# Patient Record
Sex: FEMALE | Race: BLACK OR AFRICAN AMERICAN | Employment: UNEMPLOYED | URBAN - METROPOLITAN AREA
[De-identification: names, ages, dates, MRNs, and addresses within clinical notes are randomized per-mention and may not be internally consistent; named-entity substitution may affect disease eponyms.]

---

## 2019-09-13 ENCOUNTER — ED HISTORICAL/CONVERTED ENCOUNTER (OUTPATIENT)
Dept: OTHER | Age: 24
End: 2019-09-13

## 2024-10-29 ENCOUNTER — APPOINTMENT (OUTPATIENT)
Facility: HOSPITAL | Age: 29
End: 2024-10-29
Payer: MEDICARE

## 2024-10-29 ENCOUNTER — HOSPITAL ENCOUNTER (EMERGENCY)
Facility: HOSPITAL | Age: 29
Discharge: HOME | End: 2024-10-29
Attending: STUDENT IN AN ORGANIZED HEALTH CARE EDUCATION/TRAINING PROGRAM
Payer: MEDICARE

## 2024-10-29 VITALS
DIASTOLIC BLOOD PRESSURE: 52 MMHG | HEIGHT: 64 IN | SYSTOLIC BLOOD PRESSURE: 115 MMHG | TEMPERATURE: 98.8 F | OXYGEN SATURATION: 97 % | WEIGHT: 190 LBS | BODY MASS INDEX: 32.44 KG/M2 | HEART RATE: 68 BPM | RESPIRATION RATE: 19 BRPM

## 2024-10-29 DIAGNOSIS — J06.9 VIRAL URI WITH COUGH: Primary | ICD-10-CM

## 2024-10-29 DIAGNOSIS — D64.9 ANEMIA, UNSPECIFIED TYPE: ICD-10-CM

## 2024-10-29 DIAGNOSIS — D57.00 SICKLE CELL CRISIS (HCC): ICD-10-CM

## 2024-10-29 LAB
ABO + RH BLD: NORMAL
ALBUMIN SERPL-MCNC: 3.5 G/DL (ref 3.5–5)
ALBUMIN/GLOB SERPL: 1.3 (ref 1.1–2.2)
ALP SERPL-CCNC: 123 U/L (ref 45–117)
ALT SERPL-CCNC: 27 U/L (ref 12–78)
ANION GAP SERPL CALC-SCNC: 5 MMOL/L (ref 2–12)
APPEARANCE UR: ABNORMAL
AST SERPL-CCNC: 28 U/L (ref 15–37)
BACTERIA URNS QL MICRO: ABNORMAL /HPF
BASOPHILS # BLD: 0.1 K/UL (ref 0–0.1)
BASOPHILS NFR BLD: 1 % (ref 0–1)
BILIRUB SERPL-MCNC: 3.6 MG/DL (ref 0.2–1)
BILIRUB UR QL: NEGATIVE
BLOOD BANK CMNT PATIENT-IMP: NORMAL
BLOOD GROUP ANTIBODIES SERPL: NORMAL
BUN SERPL-MCNC: 7 MG/DL (ref 6–20)
BUN/CREAT SERPL: 13 (ref 12–20)
CALCIUM SERPL-MCNC: 8.7 MG/DL (ref 8.5–10.1)
CHLORIDE SERPL-SCNC: 109 MMOL/L (ref 97–108)
CO2 SERPL-SCNC: 29 MMOL/L (ref 21–32)
COLOR UR: ABNORMAL
CREAT SERPL-MCNC: 0.52 MG/DL (ref 0.55–1.02)
DIFFERENTIAL METHOD BLD: ABNORMAL
EOSINOPHIL # BLD: 1 K/UL (ref 0–0.4)
EOSINOPHIL NFR BLD: 8 % (ref 0–7)
EPITH CASTS URNS QL MICRO: ABNORMAL /LPF
ERYTHROCYTE [DISTWIDTH] IN BLOOD BY AUTOMATED COUNT: 20.7 % (ref 11.5–14.5)
GLOBULIN SER CALC-MCNC: 2.8 G/DL (ref 2–4)
GLUCOSE SERPL-MCNC: 91 MG/DL (ref 65–100)
GLUCOSE UR STRIP.AUTO-MCNC: NEGATIVE MG/DL
HCG UR QL: NEGATIVE
HCT VFR BLD AUTO: 18.5 % (ref 35–47)
HGB BLD-MCNC: 6.6 G/DL (ref 11.5–16)
HGB UR QL STRIP: NEGATIVE
IMM GRANULOCYTES # BLD AUTO: 0.1 K/UL (ref 0–0.04)
IMM GRANULOCYTES NFR BLD AUTO: 1 % (ref 0–0.5)
KETONES UR QL STRIP.AUTO: NEGATIVE MG/DL
LEUKOCYTE ESTERASE UR QL STRIP.AUTO: NEGATIVE
LYMPHOCYTES # BLD: 2.4 K/UL (ref 0.8–3.5)
LYMPHOCYTES NFR BLD: 19 % (ref 12–49)
MCH RBC QN AUTO: 36.1 PG (ref 26–34)
MCHC RBC AUTO-ENTMCNC: 35.7 G/DL (ref 30–36.5)
MCV RBC AUTO: 101.1 FL (ref 80–99)
MONOCYTES # BLD: 1.4 K/UL (ref 0–1)
MONOCYTES NFR BLD: 11 % (ref 5–13)
MUCOUS THREADS URNS QL MICRO: ABNORMAL /LPF
NEUTS SEG # BLD: 7.7 K/UL (ref 1.8–8)
NEUTS SEG NFR BLD: 60 % (ref 32–75)
NITRITE UR QL STRIP.AUTO: NEGATIVE
NRBC # BLD: 0.32 K/UL (ref 0–0.01)
NRBC BLD-RTO: 2.5 PER 100 WBC
PH UR STRIP: 5.5 (ref 5–8)
PLATELET # BLD AUTO: 484 K/UL (ref 150–400)
PMV BLD AUTO: 10.1 FL (ref 8.9–12.9)
POTASSIUM SERPL-SCNC: 4.1 MMOL/L (ref 3.5–5.1)
PROT SERPL-MCNC: 6.3 G/DL (ref 6.4–8.2)
PROT UR STRIP-MCNC: NEGATIVE MG/DL
RBC # BLD AUTO: 1.83 M/UL (ref 3.8–5.2)
RBC #/AREA URNS HPF: ABNORMAL /HPF (ref 0–5)
RBC MORPH BLD: ABNORMAL
RBC MORPH BLD: ABNORMAL
RETICS # AUTO: 0.39 M/UL (ref 0.02–0.08)
RETICS/RBC NFR AUTO: 20.9 % (ref 0.7–2.1)
SODIUM SERPL-SCNC: 143 MMOL/L (ref 136–145)
SP GR UR REFRACTOMETRY: 1.01
SPECIMEN EXP DATE BLD: NORMAL
TROPONIN I SERPL HS-MCNC: 6 NG/L (ref 0–51)
URINE CULTURE IF INDICATED: ABNORMAL
UROBILINOGEN UR QL STRIP.AUTO: 1 EU/DL (ref 0.2–1)
WBC # BLD AUTO: 12.7 K/UL (ref 3.6–11)
WBC URNS QL MICRO: ABNORMAL /HPF (ref 0–4)

## 2024-10-29 PROCEDURE — 6360000002 HC RX W HCPCS: Performed by: STUDENT IN AN ORGANIZED HEALTH CARE EDUCATION/TRAINING PROGRAM

## 2024-10-29 PROCEDURE — 85025 COMPLETE CBC W/AUTO DIFF WBC: CPT

## 2024-10-29 PROCEDURE — 36415 COLL VENOUS BLD VENIPUNCTURE: CPT

## 2024-10-29 PROCEDURE — 71046 X-RAY EXAM CHEST 2 VIEWS: CPT

## 2024-10-29 PROCEDURE — 93005 ELECTROCARDIOGRAM TRACING: CPT | Performed by: STUDENT IN AN ORGANIZED HEALTH CARE EDUCATION/TRAINING PROGRAM

## 2024-10-29 PROCEDURE — 99285 EMERGENCY DEPT VISIT HI MDM: CPT

## 2024-10-29 PROCEDURE — 96376 TX/PRO/DX INJ SAME DRUG ADON: CPT

## 2024-10-29 PROCEDURE — 80053 COMPREHEN METABOLIC PANEL: CPT

## 2024-10-29 PROCEDURE — 81025 URINE PREGNANCY TEST: CPT

## 2024-10-29 PROCEDURE — 84484 ASSAY OF TROPONIN QUANT: CPT

## 2024-10-29 PROCEDURE — 86901 BLOOD TYPING SEROLOGIC RH(D): CPT

## 2024-10-29 PROCEDURE — 96374 THER/PROPH/DIAG INJ IV PUSH: CPT

## 2024-10-29 PROCEDURE — 81001 URINALYSIS AUTO W/SCOPE: CPT

## 2024-10-29 PROCEDURE — 86850 RBC ANTIBODY SCREEN: CPT

## 2024-10-29 PROCEDURE — 85045 AUTOMATED RETICULOCYTE COUNT: CPT

## 2024-10-29 PROCEDURE — 86900 BLOOD TYPING SEROLOGIC ABO: CPT

## 2024-10-29 PROCEDURE — 6370000000 HC RX 637 (ALT 250 FOR IP): Performed by: STUDENT IN AN ORGANIZED HEALTH CARE EDUCATION/TRAINING PROGRAM

## 2024-10-29 RX ORDER — ONDANSETRON 2 MG/ML
4 INJECTION INTRAMUSCULAR; INTRAVENOUS EVERY 6 HOURS PRN
Status: DISCONTINUED | OUTPATIENT
Start: 2024-10-29 | End: 2024-10-30 | Stop reason: HOSPADM

## 2024-10-29 RX ORDER — HEPARIN 100 UNIT/ML
300 SYRINGE INTRAVENOUS PRN
Status: DISCONTINUED | OUTPATIENT
Start: 2024-10-29 | End: 2024-10-30 | Stop reason: HOSPADM

## 2024-10-29 RX ORDER — HYDROMORPHONE HYDROCHLORIDE 1 MG/ML
4 INJECTION, SOLUTION INTRAMUSCULAR; INTRAVENOUS; SUBCUTANEOUS
Status: DISCONTINUED | OUTPATIENT
Start: 2024-10-29 | End: 2024-10-29

## 2024-10-29 RX ORDER — OXYCODONE HYDROCHLORIDE 5 MG/1
40 TABLET ORAL
Status: COMPLETED | OUTPATIENT
Start: 2024-10-29 | End: 2024-10-29

## 2024-10-29 RX ORDER — DIPHENHYDRAMINE HCL 25 MG
25 CAPSULE ORAL
Status: COMPLETED | OUTPATIENT
Start: 2024-10-29 | End: 2024-10-29

## 2024-10-29 RX ORDER — HYDROMORPHONE HYDROCHLORIDE 1 MG/ML
2 INJECTION, SOLUTION INTRAMUSCULAR; INTRAVENOUS; SUBCUTANEOUS
Status: COMPLETED | OUTPATIENT
Start: 2024-10-29 | End: 2024-10-29

## 2024-10-29 RX ORDER — HYDROMORPHONE HYDROCHLORIDE 1 MG/ML
4 INJECTION, SOLUTION INTRAMUSCULAR; INTRAVENOUS; SUBCUTANEOUS
Status: COMPLETED | OUTPATIENT
Start: 2024-10-29 | End: 2024-10-29

## 2024-10-29 RX ADMIN — HYDROMORPHONE HYDROCHLORIDE 4 MG: 1 INJECTION, SOLUTION INTRAMUSCULAR; INTRAVENOUS; SUBCUTANEOUS at 18:08

## 2024-10-29 RX ADMIN — HEPARIN 300 UNITS: 100 SYRINGE at 21:18

## 2024-10-29 RX ADMIN — HYDROMORPHONE HYDROCHLORIDE 2 MG: 1 INJECTION, SOLUTION INTRAMUSCULAR; INTRAVENOUS; SUBCUTANEOUS at 20:19

## 2024-10-29 RX ADMIN — OXYCODONE 40 MG: 5 TABLET ORAL at 17:59

## 2024-10-29 RX ADMIN — DIPHENHYDRAMINE HYDROCHLORIDE 25 MG: 25 CAPSULE ORAL at 17:59

## 2024-10-29 ASSESSMENT — PAIN SCALES - GENERAL
PAINLEVEL_OUTOF10: 8
PAINLEVEL_OUTOF10: 5

## 2024-10-29 ASSESSMENT — PAIN DESCRIPTION - ORIENTATION
ORIENTATION: MID

## 2024-10-29 ASSESSMENT — PAIN DESCRIPTION - DESCRIPTORS
DESCRIPTORS: DISCOMFORT
DESCRIPTORS: ACHING
DESCRIPTORS: ACHING;SHARP

## 2024-10-29 ASSESSMENT — PAIN DESCRIPTION - LOCATION
LOCATION: CHEST

## 2024-10-29 ASSESSMENT — PAIN - FUNCTIONAL ASSESSMENT
PAIN_FUNCTIONAL_ASSESSMENT: 0-10
PAIN_FUNCTIONAL_ASSESSMENT: 0-10

## 2024-10-29 NOTE — ED TRIAGE NOTES
Pt c/o sickle cell pain x 2 weeks.  Pt reports chest pain, chills, and generalized fatigue. Pt states her daughter was exposed to Toxoplasmosis and rhino virus. Pt states her daughter had a fever and duo nebs. Pt denies n/v/d. Pt reports inability to eat or drink. Pt denies any OTC medication.

## 2024-10-29 NOTE — ED PROVIDER NOTES
[facility] EMERGENCY DEPT  EMERGENCY DEPARTMENT ENCOUNTER       Pt Name: Tika Landers  MRN: [id number]  Birthdate 1995  Date of evaluation: 10/29/2024  Provider: Ronaldo Ahuja MD   PCP: Tu Little III, MD  Note Started: 4:57 PM EDT 10/29/24     CHIEF COMPLAINT       Chief Complaint   Patient presents with    Sickle Cell Pain Crisis        HISTORY OF PRESENT ILLNESS: 1 or more elements      History From: patient, History limited by: none     Tika Landers is a 29 y.o. female presenting with cough, chest pain, joint pain.  She notes this has been going on for about 2 weeks.  Denies fever.  Has a decreased appetite but no vomiting.  Notes her daughter was recently admitted then discharged with toxo pneumonia as there was an outbreak at her school.  She has been taking her methadone and oxycodone until last night when she stopped because it wasn't working.         Please See MDM for Additional Details of the HPI/PMH  Nursing Notes were all reviewed and agreed with or any disagreements were addressed in the HPI.     REVIEW OF SYSTEMS        Positives and Pertinent negatives as per HPI.    PAST HISTORY     Past Medical History:  Past Medical History:   Diagnosis Date    Asthma     Infusion reaction     Sickle cell anemia (HCC)        Past Surgical History:  History reviewed. No pertinent surgical history.    Family History:  History reviewed. No pertinent family history.    Social History:  Social History     Tobacco Use    Smoking status: Never    Smokeless tobacco: Never   Substance Use Topics    Alcohol use: Yes       Allergies:  Allergies   Allergen Reactions    Nsaids Anaphylaxis    Bactrim [Sulfamethoxazole-Trimethoprim]     Ciprofloxacin     Promethazine     Xarelto [Rivaroxaban]        CURRENT MEDICATIONS      Previous Medications    No medications on file       SCREENINGS               No data recorded         PHYSICAL EXAM      ED Triage Vitals   Encounter Vitals Group      BP 10/29/24 1644

## 2024-10-30 LAB
EKG ATRIAL RATE: 67 BPM
EKG DIAGNOSIS: NORMAL
EKG P AXIS: 15 DEGREES
EKG P-R INTERVAL: 124 MS
EKG Q-T INTERVAL: 402 MS
EKG QRS DURATION: 90 MS
EKG QTC CALCULATION (BAZETT): 424 MS
EKG R AXIS: 38 DEGREES
EKG T AXIS: 24 DEGREES
EKG VENTRICULAR RATE: 67 BPM

## 2024-10-30 PROCEDURE — 93010 ELECTROCARDIOGRAM REPORT: CPT | Performed by: SPECIALIST

## 2024-12-12 ENCOUNTER — APPOINTMENT (OUTPATIENT)
Facility: HOSPITAL | Age: 29
DRG: 812 | End: 2024-12-12
Payer: MEDICARE

## 2024-12-12 ENCOUNTER — HOSPITAL ENCOUNTER (INPATIENT)
Facility: HOSPITAL | Age: 29
LOS: 1 days | Discharge: LEFT BEFORE DISCHARGE | DRG: 812 | End: 2024-12-14
Attending: EMERGENCY MEDICINE | Admitting: INTERNAL MEDICINE
Payer: MEDICARE

## 2024-12-12 DIAGNOSIS — D57.00 SICKLE CELL PAIN CRISIS (HCC): Primary | ICD-10-CM

## 2024-12-12 DIAGNOSIS — D64.9 CHRONIC ANEMIA: ICD-10-CM

## 2024-12-12 DIAGNOSIS — R52 INTRACTABLE PAIN: ICD-10-CM

## 2024-12-12 LAB
ALBUMIN SERPL-MCNC: 4.1 G/DL (ref 3.5–5)
ALBUMIN/GLOB SERPL: 1.3 (ref 1.1–2.2)
ALP SERPL-CCNC: 133 U/L (ref 45–117)
ALT SERPL-CCNC: 20 U/L (ref 12–78)
ANION GAP SERPL CALC-SCNC: 13 MMOL/L (ref 2–12)
AST SERPL-CCNC: 17 U/L (ref 15–37)
BASOPHILS # BLD: 0.1 K/UL (ref 0–0.1)
BASOPHILS NFR BLD: 1 % (ref 0–1)
BILIRUB SERPL-MCNC: 2.6 MG/DL (ref 0.2–1)
BUN SERPL-MCNC: 4 MG/DL (ref 6–20)
BUN/CREAT SERPL: 6 (ref 12–20)
CALCIUM SERPL-MCNC: 9.3 MG/DL (ref 8.5–10.1)
CHLORIDE SERPL-SCNC: 106 MMOL/L (ref 97–108)
CO2 SERPL-SCNC: 23 MMOL/L (ref 21–32)
CREAT SERPL-MCNC: 0.69 MG/DL (ref 0.55–1.02)
DIFFERENTIAL METHOD BLD: ABNORMAL
EOSINOPHIL # BLD: 0.1 K/UL (ref 0–0.4)
EOSINOPHIL NFR BLD: 1 % (ref 0–7)
ERYTHROCYTE [DISTWIDTH] IN BLOOD BY AUTOMATED COUNT: 16.9 % (ref 11.5–14.5)
GLOBULIN SER CALC-MCNC: 3.1 G/DL (ref 2–4)
GLUCOSE SERPL-MCNC: 95 MG/DL (ref 65–100)
HCT VFR BLD AUTO: 21.6 % (ref 35–47)
HGB BLD-MCNC: 7.6 G/DL (ref 11.5–16)
IMM GRANULOCYTES # BLD AUTO: 0 K/UL (ref 0–0.04)
IMM GRANULOCYTES NFR BLD AUTO: 0 % (ref 0–0.5)
LYMPHOCYTES # BLD: 1.3 K/UL (ref 0.8–3.5)
LYMPHOCYTES NFR BLD: 13 % (ref 12–49)
MAGNESIUM SERPL-MCNC: 2.2 MG/DL (ref 1.6–2.4)
MCH RBC QN AUTO: 36.4 PG (ref 26–34)
MCHC RBC AUTO-ENTMCNC: 35.2 G/DL (ref 30–36.5)
MCV RBC AUTO: 103.3 FL (ref 80–99)
MONOCYTES # BLD: 0.6 K/UL (ref 0–1)
MONOCYTES NFR BLD: 6 % (ref 5–13)
NEUTS SEG # BLD: 7.9 K/UL (ref 1.8–8)
NEUTS SEG NFR BLD: 79 % (ref 32–75)
NRBC # BLD: 0.25 K/UL (ref 0–0.01)
NRBC BLD-RTO: 2.5 PER 100 WBC
PLATELET # BLD AUTO: 530 K/UL (ref 150–400)
PMV BLD AUTO: 9.5 FL (ref 8.9–12.9)
POTASSIUM SERPL-SCNC: 3.5 MMOL/L (ref 3.5–5.1)
PROT SERPL-MCNC: 7.2 G/DL (ref 6.4–8.2)
RBC # BLD AUTO: 2.09 M/UL (ref 3.8–5.2)
RETICS # AUTO: 0.41 M/UL (ref 0.02–0.08)
RETICS/RBC NFR AUTO: 18.4 % (ref 0.7–2.1)
SODIUM SERPL-SCNC: 142 MMOL/L (ref 136–145)
WBC # BLD AUTO: 10 K/UL (ref 3.6–11)

## 2024-12-12 PROCEDURE — 80053 COMPREHEN METABOLIC PANEL: CPT

## 2024-12-12 PROCEDURE — 99285 EMERGENCY DEPT VISIT HI MDM: CPT

## 2024-12-12 PROCEDURE — 71045 X-RAY EXAM CHEST 1 VIEW: CPT

## 2024-12-12 PROCEDURE — 6370000000 HC RX 637 (ALT 250 FOR IP): Performed by: EMERGENCY MEDICINE

## 2024-12-12 PROCEDURE — 6360000002 HC RX W HCPCS: Performed by: EMERGENCY MEDICINE

## 2024-12-12 PROCEDURE — 96374 THER/PROPH/DIAG INJ IV PUSH: CPT

## 2024-12-12 PROCEDURE — 85025 COMPLETE CBC W/AUTO DIFF WBC: CPT

## 2024-12-12 PROCEDURE — 85045 AUTOMATED RETICULOCYTE COUNT: CPT

## 2024-12-12 PROCEDURE — 93005 ELECTROCARDIOGRAM TRACING: CPT | Performed by: EMERGENCY MEDICINE

## 2024-12-12 PROCEDURE — 83735 ASSAY OF MAGNESIUM: CPT

## 2024-12-12 RX ORDER — DIPHENHYDRAMINE HCL 25 MG
50 CAPSULE ORAL
Status: COMPLETED | OUTPATIENT
Start: 2024-12-12 | End: 2024-12-12

## 2024-12-12 RX ORDER — HYDROMORPHONE HYDROCHLORIDE 1 MG/ML
4 INJECTION, SOLUTION INTRAMUSCULAR; INTRAVENOUS; SUBCUTANEOUS
Status: COMPLETED | OUTPATIENT
Start: 2024-12-12 | End: 2024-12-12

## 2024-12-12 RX ORDER — OXYCODONE HYDROCHLORIDE 5 MG/1
40 TABLET ORAL
Status: COMPLETED | OUTPATIENT
Start: 2024-12-12 | End: 2024-12-12

## 2024-12-12 RX ADMIN — HYDROMORPHONE HYDROCHLORIDE 4 MG: 1 INJECTION, SOLUTION INTRAMUSCULAR; INTRAVENOUS; SUBCUTANEOUS at 23:32

## 2024-12-12 RX ADMIN — OXYCODONE 40 MG: 5 TABLET ORAL at 23:33

## 2024-12-12 RX ADMIN — DIPHENHYDRAMINE HYDROCHLORIDE 50 MG: 25 CAPSULE ORAL at 23:33

## 2024-12-12 ASSESSMENT — PAIN DESCRIPTION - PAIN TYPE: TYPE: ACUTE PAIN

## 2024-12-12 ASSESSMENT — PAIN DESCRIPTION - ORIENTATION: ORIENTATION: RIGHT;LEFT;MID

## 2024-12-12 ASSESSMENT — PAIN SCALES - GENERAL
PAINLEVEL_OUTOF10: 9
PAINLEVEL_OUTOF10: 8

## 2024-12-12 ASSESSMENT — PAIN DESCRIPTION - DESCRIPTORS
DESCRIPTORS: DISCOMFORT
DESCRIPTORS: BURNING;STABBING

## 2024-12-12 ASSESSMENT — PAIN DESCRIPTION - LOCATION
LOCATION: CHEST;GENERALIZED
LOCATION: CHEST

## 2024-12-12 ASSESSMENT — PAIN - FUNCTIONAL ASSESSMENT: PAIN_FUNCTIONAL_ASSESSMENT: 0-10

## 2024-12-13 PROBLEM — D57.00 SICKLE CELL PAIN CRISIS (HCC): Status: ACTIVE | Noted: 2024-12-13

## 2024-12-13 LAB
ALBUMIN SERPL-MCNC: 3.9 G/DL (ref 3.5–5)
ALBUMIN/GLOB SERPL: 1.2 (ref 1.1–2.2)
ALP SERPL-CCNC: 126 U/L (ref 45–117)
ALT SERPL-CCNC: 22 U/L (ref 12–78)
ANION GAP SERPL CALC-SCNC: 12 MMOL/L (ref 2–12)
AST SERPL-CCNC: 16 U/L (ref 15–37)
BASOPHILS # BLD: 0.1 K/UL (ref 0–0.1)
BASOPHILS NFR BLD: 1 % (ref 0–1)
BILIRUB SERPL-MCNC: 2.6 MG/DL (ref 0.2–1)
BUN SERPL-MCNC: 4 MG/DL (ref 6–20)
BUN/CREAT SERPL: 5 (ref 12–20)
CALCIUM SERPL-MCNC: 9.1 MG/DL (ref 8.5–10.1)
CHLORIDE SERPL-SCNC: 107 MMOL/L (ref 97–108)
CO2 SERPL-SCNC: 24 MMOL/L (ref 21–32)
CREAT SERPL-MCNC: 0.74 MG/DL (ref 0.55–1.02)
DIFFERENTIAL METHOD BLD: ABNORMAL
EKG ATRIAL RATE: 59 BPM
EKG DIAGNOSIS: NORMAL
EKG P AXIS: 37 DEGREES
EKG P-R INTERVAL: 132 MS
EKG Q-T INTERVAL: 416 MS
EKG QRS DURATION: 92 MS
EKG QTC CALCULATION (BAZETT): 411 MS
EKG R AXIS: 69 DEGREES
EKG T AXIS: 49 DEGREES
EKG VENTRICULAR RATE: 59 BPM
EOSINOPHIL # BLD: 0.4 K/UL (ref 0–0.4)
EOSINOPHIL NFR BLD: 3 % (ref 0–7)
ERYTHROCYTE [DISTWIDTH] IN BLOOD BY AUTOMATED COUNT: 16.8 % (ref 11.5–14.5)
FERRITIN SERPL-MCNC: 2174 NG/ML (ref 26–388)
FOLATE SERPL-MCNC: 8 NG/ML (ref 5–21)
GLOBULIN SER CALC-MCNC: 3.2 G/DL (ref 2–4)
GLUCOSE SERPL-MCNC: 104 MG/DL (ref 65–100)
HCG SERPL QL: NEGATIVE
HCT VFR BLD AUTO: 20.6 % (ref 35–47)
HGB BLD-MCNC: 7.4 G/DL (ref 11.5–16)
IMM GRANULOCYTES # BLD AUTO: 0 K/UL (ref 0–0.04)
IMM GRANULOCYTES NFR BLD AUTO: 0 % (ref 0–0.5)
IRON SATN MFR SERPL: 84 % (ref 20–50)
IRON SERPL-MCNC: 142 UG/DL (ref 35–150)
LIPASE SERPL-CCNC: 35 U/L (ref 13–75)
LYMPHOCYTES # BLD: 2.6 K/UL (ref 0.8–3.5)
LYMPHOCYTES NFR BLD: 25 % (ref 12–49)
MAGNESIUM SERPL-MCNC: 2.4 MG/DL (ref 1.6–2.4)
MCH RBC QN AUTO: 37.4 PG (ref 26–34)
MCHC RBC AUTO-ENTMCNC: 35.9 G/DL (ref 30–36.5)
MCV RBC AUTO: 104 FL (ref 80–99)
MONOCYTES # BLD: 1.4 K/UL (ref 0–1)
MONOCYTES NFR BLD: 13 % (ref 5–13)
NEUTS SEG # BLD: 6.1 K/UL (ref 1.8–8)
NEUTS SEG NFR BLD: 58 % (ref 32–75)
NRBC # BLD: 0.22 K/UL (ref 0–0.01)
NRBC BLD-RTO: 2.1 PER 100 WBC
PHOSPHATE SERPL-MCNC: 4 MG/DL (ref 2.6–4.7)
PLATELET # BLD AUTO: 493 K/UL (ref 150–400)
PMV BLD AUTO: 9.8 FL (ref 8.9–12.9)
POTASSIUM SERPL-SCNC: 3.3 MMOL/L (ref 3.5–5.1)
PROT SERPL-MCNC: 7.1 G/DL (ref 6.4–8.2)
RBC # BLD AUTO: 1.98 M/UL (ref 3.8–5.2)
SODIUM SERPL-SCNC: 143 MMOL/L (ref 136–145)
TIBC SERPL-MCNC: 170 UG/DL (ref 250–450)
TROPONIN I SERPL HS-MCNC: 5 NG/L (ref 0–51)
TSH SERPL DL<=0.05 MIU/L-ACNC: 0.4 UIU/ML (ref 0.36–3.74)
VIT B12 SERPL-MCNC: 505 PG/ML (ref 193–986)
WBC # BLD AUTO: 10.6 K/UL (ref 3.6–11)

## 2024-12-13 PROCEDURE — 36415 COLL VENOUS BLD VENIPUNCTURE: CPT

## 2024-12-13 PROCEDURE — 2580000003 HC RX 258: Performed by: INTERNAL MEDICINE

## 2024-12-13 PROCEDURE — 84443 ASSAY THYROID STIM HORMONE: CPT

## 2024-12-13 PROCEDURE — 83690 ASSAY OF LIPASE: CPT

## 2024-12-13 PROCEDURE — 6370000000 HC RX 637 (ALT 250 FOR IP): Performed by: INTERNAL MEDICINE

## 2024-12-13 PROCEDURE — 84100 ASSAY OF PHOSPHORUS: CPT

## 2024-12-13 PROCEDURE — 84484 ASSAY OF TROPONIN QUANT: CPT

## 2024-12-13 PROCEDURE — 83550 IRON BINDING TEST: CPT

## 2024-12-13 PROCEDURE — 82607 VITAMIN B-12: CPT

## 2024-12-13 PROCEDURE — 85025 COMPLETE CBC W/AUTO DIFF WBC: CPT

## 2024-12-13 PROCEDURE — 82746 ASSAY OF FOLIC ACID SERUM: CPT

## 2024-12-13 PROCEDURE — 6360000002 HC RX W HCPCS: Performed by: INTERNAL MEDICINE

## 2024-12-13 PROCEDURE — 84703 CHORIONIC GONADOTROPIN ASSAY: CPT

## 2024-12-13 PROCEDURE — 83735 ASSAY OF MAGNESIUM: CPT

## 2024-12-13 PROCEDURE — 82728 ASSAY OF FERRITIN: CPT

## 2024-12-13 PROCEDURE — 80053 COMPREHEN METABOLIC PANEL: CPT

## 2024-12-13 PROCEDURE — 6360000002 HC RX W HCPCS: Performed by: EMERGENCY MEDICINE

## 2024-12-13 PROCEDURE — 93010 ELECTROCARDIOGRAM REPORT: CPT | Performed by: SPECIALIST

## 2024-12-13 PROCEDURE — 1200000000 HC SEMI PRIVATE

## 2024-12-13 PROCEDURE — 83540 ASSAY OF IRON: CPT

## 2024-12-13 PROCEDURE — 96376 TX/PRO/DX INJ SAME DRUG ADON: CPT

## 2024-12-13 RX ORDER — MECOBALAMIN 5000 MCG
5 TABLET,DISINTEGRATING ORAL NIGHTLY PRN
Status: DISCONTINUED | OUTPATIENT
Start: 2024-12-13 | End: 2024-12-14 | Stop reason: HOSPADM

## 2024-12-13 RX ORDER — ALBUTEROL SULFATE 0.83 MG/ML
2.5 SOLUTION RESPIRATORY (INHALATION) EVERY 6 HOURS PRN
Status: DISCONTINUED | OUTPATIENT
Start: 2024-12-13 | End: 2024-12-14 | Stop reason: HOSPADM

## 2024-12-13 RX ORDER — METHADONE HYDROCHLORIDE 10 MG/1
10 TABLET ORAL 2 TIMES DAILY
COMMUNITY

## 2024-12-13 RX ORDER — APIXABAN 5 MG/1
5 TABLET, FILM COATED ORAL 2 TIMES DAILY
Status: ON HOLD | COMMUNITY
Start: 2024-09-23 | End: 2024-12-14

## 2024-12-13 RX ORDER — HYDROMORPHONE HYDROCHLORIDE 1 MG/ML
1 INJECTION, SOLUTION INTRAMUSCULAR; INTRAVENOUS; SUBCUTANEOUS
Status: DISCONTINUED | OUTPATIENT
Start: 2024-12-13 | End: 2024-12-13

## 2024-12-13 RX ORDER — MAGNESIUM SULFATE IN WATER 40 MG/ML
2000 INJECTION, SOLUTION INTRAVENOUS PRN
Status: DISCONTINUED | OUTPATIENT
Start: 2024-12-13 | End: 2024-12-14 | Stop reason: HOSPADM

## 2024-12-13 RX ORDER — NORETHINDRONE ACETATE AND ETHINYL ESTRADIOL 1MG-20(21)
1 KIT ORAL DAILY
Status: ON HOLD | COMMUNITY
End: 2024-12-13 | Stop reason: ALTCHOICE

## 2024-12-13 RX ORDER — ACETAMINOPHEN 325 MG/1
650 TABLET ORAL EVERY 6 HOURS PRN
Status: DISCONTINUED | OUTPATIENT
Start: 2024-12-13 | End: 2024-12-14 | Stop reason: HOSPADM

## 2024-12-13 RX ORDER — POTASSIUM CHLORIDE 750 MG/1
40 TABLET, EXTENDED RELEASE ORAL PRN
Status: DISCONTINUED | OUTPATIENT
Start: 2024-12-13 | End: 2024-12-14 | Stop reason: HOSPADM

## 2024-12-13 RX ORDER — BISACODYL 10 MG
10 SUPPOSITORY, RECTAL RECTAL DAILY PRN
Status: DISCONTINUED | OUTPATIENT
Start: 2024-12-13 | End: 2024-12-14 | Stop reason: HOSPADM

## 2024-12-13 RX ORDER — HYDROMORPHONE HYDROCHLORIDE 1 MG/ML
2 INJECTION, SOLUTION INTRAMUSCULAR; INTRAVENOUS; SUBCUTANEOUS
Status: COMPLETED | OUTPATIENT
Start: 2024-12-13 | End: 2024-12-13

## 2024-12-13 RX ORDER — POLYETHYLENE GLYCOL 3350 17 G/17G
17 POWDER, FOR SOLUTION ORAL DAILY PRN
Status: DISCONTINUED | OUTPATIENT
Start: 2024-12-13 | End: 2024-12-14 | Stop reason: HOSPADM

## 2024-12-13 RX ORDER — HEPARIN 100 UNIT/ML
300 SYRINGE INTRAVENOUS PRN
Status: DISCONTINUED | OUTPATIENT
Start: 2024-12-13 | End: 2024-12-14 | Stop reason: HOSPADM

## 2024-12-13 RX ORDER — OXYCODONE HCL 10 MG/1
10 TABLET, FILM COATED, EXTENDED RELEASE ORAL EVERY 12 HOURS
Status: ON HOLD | COMMUNITY
End: 2024-12-13 | Stop reason: ALTCHOICE

## 2024-12-13 RX ORDER — SODIUM CHLORIDE 450 MG/100ML
INJECTION, SOLUTION INTRAVENOUS CONTINUOUS
Status: DISCONTINUED | OUTPATIENT
Start: 2024-12-13 | End: 2024-12-14 | Stop reason: HOSPADM

## 2024-12-13 RX ORDER — NALOXONE HYDROCHLORIDE 0.4 MG/ML
INJECTION, SOLUTION INTRAMUSCULAR; INTRAVENOUS; SUBCUTANEOUS PRN
Status: DISCONTINUED | OUTPATIENT
Start: 2024-12-13 | End: 2024-12-14 | Stop reason: HOSPADM

## 2024-12-13 RX ORDER — OLANZAPINE 5 MG/1
5 TABLET ORAL EVERY 6 HOURS
Status: DISCONTINUED | OUTPATIENT
Start: 2024-12-13 | End: 2024-12-13

## 2024-12-13 RX ORDER — HYDROXYUREA 500 MG/1
1500 CAPSULE ORAL DAILY
Status: DISCONTINUED | OUTPATIENT
Start: 2024-12-13 | End: 2024-12-14 | Stop reason: HOSPADM

## 2024-12-13 RX ORDER — OXYCODONE HYDROCHLORIDE 5 MG/1
40 TABLET ORAL EVERY 4 HOURS PRN
Status: DISCONTINUED | OUTPATIENT
Start: 2024-12-13 | End: 2024-12-14 | Stop reason: HOSPADM

## 2024-12-13 RX ORDER — HYDROXYZINE PAMOATE 25 MG/1
25 CAPSULE ORAL 3 TIMES DAILY PRN
Status: ON HOLD | COMMUNITY
End: 2024-12-13 | Stop reason: ALTCHOICE

## 2024-12-13 RX ORDER — HYDROXYUREA 500 MG/1
500 CAPSULE ORAL DAILY
Status: ON HOLD | COMMUNITY
End: 2024-12-14 | Stop reason: HOSPADM

## 2024-12-13 RX ORDER — VOXELOTOR 500 MG/1
TABLET, FILM COATED ORAL
Status: ON HOLD | COMMUNITY
Start: 2024-09-23 | End: 2024-12-13 | Stop reason: ALTCHOICE

## 2024-12-13 RX ORDER — FOLIC ACID 1 MG/1
1 TABLET ORAL DAILY
Status: ON HOLD | COMMUNITY
End: 2024-12-14

## 2024-12-13 RX ORDER — SODIUM CHLORIDE 9 MG/ML
INJECTION, SOLUTION INTRAVENOUS CONTINUOUS
Status: DISCONTINUED | OUTPATIENT
Start: 2024-12-13 | End: 2024-12-13

## 2024-12-13 RX ORDER — OXYCODONE HYDROCHLORIDE 5 MG/1
20 TABLET ORAL EVERY 4 HOURS PRN
Status: DISCONTINUED | OUTPATIENT
Start: 2024-12-13 | End: 2024-12-14 | Stop reason: HOSPADM

## 2024-12-13 RX ORDER — IPRATROPIUM BROMIDE AND ALBUTEROL SULFATE 2.5; .5 MG/3ML; MG/3ML
1 SOLUTION RESPIRATORY (INHALATION) EVERY 4 HOURS PRN
Status: DISCONTINUED | OUTPATIENT
Start: 2024-12-13 | End: 2024-12-14 | Stop reason: HOSPADM

## 2024-12-13 RX ORDER — SODIUM CHLORIDE 9 MG/ML
INJECTION, SOLUTION INTRAVENOUS PRN
Status: DISCONTINUED | OUTPATIENT
Start: 2024-12-13 | End: 2024-12-14 | Stop reason: HOSPADM

## 2024-12-13 RX ORDER — DEFERASIROX 125 MG/1
TABLET, FOR SUSPENSION ORAL
Status: ON HOLD | COMMUNITY
End: 2024-12-13 | Stop reason: ALTCHOICE

## 2024-12-13 RX ORDER — LAMOTRIGINE 100 MG/1
100 TABLET ORAL 2 TIMES DAILY
Status: ON HOLD | COMMUNITY
End: 2024-12-13 | Stop reason: ALTCHOICE

## 2024-12-13 RX ORDER — ONDANSETRON 4 MG/1
4 TABLET, ORALLY DISINTEGRATING ORAL EVERY 8 HOURS PRN
Status: DISCONTINUED | OUTPATIENT
Start: 2024-12-13 | End: 2024-12-14 | Stop reason: HOSPADM

## 2024-12-13 RX ORDER — OXYCODONE HYDROCHLORIDE 5 MG/1
5 TABLET ORAL EVERY 4 HOURS PRN
Status: DISCONTINUED | OUTPATIENT
Start: 2024-12-13 | End: 2024-12-13

## 2024-12-13 RX ORDER — BUPRENORPHINE HYDROCHLORIDE 450 UG/1
FILM, SOLUBLE BUCCAL
Status: ON HOLD | COMMUNITY
Start: 2024-10-15 | End: 2024-12-13 | Stop reason: ALTCHOICE

## 2024-12-13 RX ORDER — ACETAMINOPHEN 650 MG/1
650 SUPPOSITORY RECTAL EVERY 6 HOURS PRN
Status: DISCONTINUED | OUTPATIENT
Start: 2024-12-13 | End: 2024-12-14 | Stop reason: HOSPADM

## 2024-12-13 RX ORDER — LIDOCAINE 4 G/G
1 PATCH TOPICAL DAILY
Status: DISCONTINUED | OUTPATIENT
Start: 2024-12-13 | End: 2024-12-14 | Stop reason: HOSPADM

## 2024-12-13 RX ORDER — LAMOTRIGINE 100 MG/1
100 TABLET ORAL 2 TIMES DAILY
Status: DISCONTINUED | OUTPATIENT
Start: 2024-12-13 | End: 2024-12-13

## 2024-12-13 RX ORDER — ONDANSETRON 4 MG/1
4 TABLET, ORALLY DISINTEGRATING ORAL EVERY 8 HOURS PRN
Status: ON HOLD | COMMUNITY
Start: 2023-12-22 | End: 2024-12-13 | Stop reason: ALTCHOICE

## 2024-12-13 RX ORDER — SERTRALINE HYDROCHLORIDE 25 MG/1
25 TABLET, FILM COATED ORAL DAILY
Status: ON HOLD | COMMUNITY
End: 2024-12-13 | Stop reason: ALTCHOICE

## 2024-12-13 RX ORDER — HYDROMORPHONE HYDROCHLORIDE 1 MG/ML
4 INJECTION, SOLUTION INTRAMUSCULAR; INTRAVENOUS; SUBCUTANEOUS
Status: COMPLETED | OUTPATIENT
Start: 2024-12-13 | End: 2024-12-13

## 2024-12-13 RX ORDER — HYDROMORPHONE HYDROCHLORIDE 1 MG/ML
1 INJECTION, SOLUTION INTRAMUSCULAR; INTRAVENOUS; SUBCUTANEOUS EVERY 4 HOURS PRN
Status: DISCONTINUED | OUTPATIENT
Start: 2024-12-13 | End: 2024-12-13

## 2024-12-13 RX ORDER — OXYCODONE HCL 10 MG/1
10 TABLET, FILM COATED, EXTENDED RELEASE ORAL EVERY 12 HOURS
Status: DISCONTINUED | OUTPATIENT
Start: 2024-12-13 | End: 2024-12-13

## 2024-12-13 RX ORDER — SODIUM CHLORIDE 0.9 % (FLUSH) 0.9 %
5-40 SYRINGE (ML) INJECTION PRN
Status: DISCONTINUED | OUTPATIENT
Start: 2024-12-13 | End: 2024-12-14 | Stop reason: HOSPADM

## 2024-12-13 RX ORDER — POTASSIUM CHLORIDE 7.45 MG/ML
10 INJECTION INTRAVENOUS PRN
Status: DISCONTINUED | OUTPATIENT
Start: 2024-12-13 | End: 2024-12-14 | Stop reason: HOSPADM

## 2024-12-13 RX ORDER — HYDROXYUREA 500 MG/1
500 CAPSULE ORAL DAILY
Status: DISCONTINUED | OUTPATIENT
Start: 2024-12-13 | End: 2024-12-13

## 2024-12-13 RX ORDER — FOLIC ACID 1 MG/1
1 TABLET ORAL DAILY
Status: DISCONTINUED | OUTPATIENT
Start: 2024-12-13 | End: 2024-12-14 | Stop reason: HOSPADM

## 2024-12-13 RX ORDER — HYDROXYZINE HYDROCHLORIDE 25 MG/1
25 TABLET, FILM COATED ORAL 3 TIMES DAILY PRN
Status: DISCONTINUED | OUTPATIENT
Start: 2024-12-13 | End: 2024-12-14 | Stop reason: HOSPADM

## 2024-12-13 RX ORDER — OLANZAPINE 5 MG/1
5 TABLET ORAL EVERY 6 HOURS
Status: ON HOLD | COMMUNITY
End: 2024-12-13 | Stop reason: ALTCHOICE

## 2024-12-13 RX ORDER — ONDANSETRON 2 MG/ML
4 INJECTION INTRAMUSCULAR; INTRAVENOUS EVERY 6 HOURS PRN
Status: DISCONTINUED | OUTPATIENT
Start: 2024-12-13 | End: 2024-12-14 | Stop reason: HOSPADM

## 2024-12-13 RX ORDER — OXYCODONE HYDROCHLORIDE 30 MG/1
30 TABLET ORAL EVERY 4 HOURS PRN
COMMUNITY
Start: 2024-12-08

## 2024-12-13 RX ORDER — SODIUM CHLORIDE 0.9 % (FLUSH) 0.9 %
5-40 SYRINGE (ML) INJECTION EVERY 12 HOURS SCHEDULED
Status: DISCONTINUED | OUTPATIENT
Start: 2024-12-13 | End: 2024-12-14 | Stop reason: HOSPADM

## 2024-12-13 RX ORDER — OXYCODONE HYDROCHLORIDE 5 MG/1
10 TABLET ORAL EVERY 4 HOURS PRN
Status: DISCONTINUED | OUTPATIENT
Start: 2024-12-13 | End: 2024-12-13

## 2024-12-13 RX ADMIN — OXYCODONE 40 MG: 5 TABLET ORAL at 20:51

## 2024-12-13 RX ADMIN — POTASSIUM CHLORIDE 40 MEQ: 750 TABLET, EXTENDED RELEASE ORAL at 09:11

## 2024-12-13 RX ADMIN — HYDROMORPHONE HYDROCHLORIDE 2 MG: 1 INJECTION, SOLUTION INTRAMUSCULAR; INTRAVENOUS; SUBCUTANEOUS at 07:58

## 2024-12-13 RX ADMIN — OLANZAPINE 5 MG: 5 TABLET, FILM COATED ORAL at 05:30

## 2024-12-13 RX ADMIN — APIXABAN 5 MG: 5 TABLET, FILM COATED ORAL at 08:43

## 2024-12-13 RX ADMIN — HYDROMORPHONE HYDROCHLORIDE 1 MG: 1 INJECTION, SOLUTION INTRAMUSCULAR; INTRAVENOUS; SUBCUTANEOUS at 05:40

## 2024-12-13 RX ADMIN — OLANZAPINE 5 MG: 5 TABLET, FILM COATED ORAL at 18:15

## 2024-12-13 RX ADMIN — HYDROXYUREA 1500 MG: 500 CAPSULE ORAL at 09:42

## 2024-12-13 RX ADMIN — OLANZAPINE 5 MG: 5 TABLET, FILM COATED ORAL at 11:16

## 2024-12-13 RX ADMIN — SODIUM CHLORIDE, PRESERVATIVE FREE 10 ML: 5 INJECTION INTRAVENOUS at 07:58

## 2024-12-13 RX ADMIN — OXYCODONE 40 MG: 5 TABLET ORAL at 14:46

## 2024-12-13 RX ADMIN — OXYCODONE 40 MG: 5 TABLET ORAL at 10:03

## 2024-12-13 RX ADMIN — OXYCODONE 10 MG: 5 TABLET ORAL at 05:30

## 2024-12-13 RX ADMIN — SODIUM CHLORIDE: 4.5 INJECTION, SOLUTION INTRAVENOUS at 09:11

## 2024-12-13 RX ADMIN — FOLIC ACID 1 MG: 1 TABLET ORAL at 08:42

## 2024-12-13 RX ADMIN — HYDROMORPHONE HYDROCHLORIDE 4 MG: 1 INJECTION, SOLUTION INTRAMUSCULAR; INTRAVENOUS; SUBCUTANEOUS at 01:31

## 2024-12-13 RX ADMIN — SODIUM CHLORIDE: 9 INJECTION, SOLUTION INTRAVENOUS at 05:42

## 2024-12-13 RX ADMIN — HYDROMORPHONE HYDROCHLORIDE: 10 INJECTION, SOLUTION INTRAMUSCULAR; INTRAVENOUS; SUBCUTANEOUS at 10:25

## 2024-12-13 RX ADMIN — HYDROMORPHONE HYDROCHLORIDE 4 MG: 1 INJECTION, SOLUTION INTRAMUSCULAR; INTRAVENOUS; SUBCUTANEOUS at 04:09

## 2024-12-13 RX ADMIN — APIXABAN 5 MG: 5 TABLET, FILM COATED ORAL at 20:48

## 2024-12-13 ASSESSMENT — PAIN DESCRIPTION - DESCRIPTORS
DESCRIPTORS: ACHING
DESCRIPTORS: ACHING;SHARP
DESCRIPTORS: ACHING;SHARP
DESCRIPTORS: GNAWING;SHARP
DESCRIPTORS: ACHING;SHARP
DESCRIPTORS: ACHING;SHARP
DESCRIPTORS: ACHING
DESCRIPTORS: ACHING;SHARP
DESCRIPTORS: DISCOMFORT
DESCRIPTORS: ACHING;SHARP
DESCRIPTORS: DISCOMFORT
DESCRIPTORS: DISCOMFORT

## 2024-12-13 ASSESSMENT — PAIN SCALES - GENERAL
PAINLEVEL_OUTOF10: 8
PAINLEVEL_OUTOF10: 7
PAINLEVEL_OUTOF10: 5
PAINLEVEL_OUTOF10: 6
PAINLEVEL_OUTOF10: 8
PAINLEVEL_OUTOF10: 9
PAINLEVEL_OUTOF10: 7
PAINLEVEL_OUTOF10: 6
PAINLEVEL_OUTOF10: 6
PAINLEVEL_OUTOF10: 7
PAINLEVEL_OUTOF10: 8
PAINLEVEL_OUTOF10: 9
PAINLEVEL_OUTOF10: 8
PAINLEVEL_OUTOF10: 7
PAINLEVEL_OUTOF10: 8

## 2024-12-13 ASSESSMENT — PAIN DESCRIPTION - PAIN TYPE
TYPE: ACUTE PAIN

## 2024-12-13 ASSESSMENT — PAIN DESCRIPTION - LOCATION
LOCATION: GENERALIZED
LOCATION: GENERALIZED
LOCATION: CHEST;GENERALIZED
LOCATION: CHEST;GENERALIZED
LOCATION: CHEST
LOCATION: CHEST;GENERALIZED
LOCATION: CHEST;GENERALIZED
LOCATION: GENERALIZED
LOCATION: CHEST;GENERALIZED

## 2024-12-13 ASSESSMENT — PAIN DESCRIPTION - ORIENTATION
ORIENTATION: RIGHT;LEFT;MID
ORIENTATION: LEFT;RIGHT;ANTERIOR;POSTERIOR
ORIENTATION: RIGHT;LEFT;MID
ORIENTATION: RIGHT;LEFT;MID

## 2024-12-13 ASSESSMENT — ENCOUNTER SYMPTOMS
SHORTNESS OF BREATH: 0
COLOR CHANGE: 0
VOMITING: 0
NAUSEA: 0
ABDOMINAL PAIN: 0
COUGH: 0
DIARRHEA: 0

## 2024-12-13 ASSESSMENT — PAIN DESCRIPTION - ONSET
ONSET: ON-GOING
ONSET: ON-GOING

## 2024-12-13 ASSESSMENT — PAIN - FUNCTIONAL ASSESSMENT
PAIN_FUNCTIONAL_ASSESSMENT: 0-10
PAIN_FUNCTIONAL_ASSESSMENT: PREVENTS OR INTERFERES SOME ACTIVE ACTIVITIES AND ADLS
PAIN_FUNCTIONAL_ASSESSMENT: PREVENTS OR INTERFERES SOME ACTIVE ACTIVITIES AND ADLS

## 2024-12-13 ASSESSMENT — PAIN DESCRIPTION - FREQUENCY
FREQUENCY: CONTINUOUS
FREQUENCY: CONTINUOUS

## 2024-12-13 NOTE — PROGRESS NOTES
14: 45 Went to round on the patient room for nurse leader visit, patient said that her pain was not better managed. Talked to the RN abdon about the pain management. Went back to the room with the nurse, patient had a visitor and he gave her something that she was trying to put inside her blanket and look suspicious. Nursing supervisor informed. Security, rn supervisor, CCL and RN checked the room as she gave consent. No items found. Patient refused of having drugs or medicine from outside or any substance that was harmful for her.

## 2024-12-13 NOTE — PROGRESS NOTES
TRANSFER - IN REPORT:    Verbal report received from LINH Chou(name) on Tika Perminter  being received from ED(unit) for routine progression of patient care      Report consisted of patient’s Situation, Background, Assessment and   Recommendations(SBAR).     Information from the following report(s) ED Encounter Summary, ED SBAR, MAR, Recent Results, Med Rec Status, Cardiac Rhythm NSR, and Neuro Assessment was reviewed with the receiving nurse.    Opportunity for questions and clarification was provided.

## 2024-12-13 NOTE — PLAN OF CARE
Problem: Discharge Planning  Goal: Discharge to home or other facility with appropriate resources  12/13/2024 0853 by Hamlet Perez RN  Outcome: Progressing  12/13/2024 0819 by Nelson Zapata RN  Outcome: Progressing     Problem: Pain  Goal: Verbalizes/displays adequate comfort level or baseline comfort level  12/13/2024 0853 by Hamlet Perez RN  Outcome: Progressing  12/13/2024 0819 by Nelson Zapata RN  Outcome: Progressing     Problem: Cardiovascular - Adult  Goal: Maintains optimal cardiac output and hemodynamic stability  Outcome: Progressing  Goal: Absence of cardiac dysrhythmias or at baseline  Outcome: Progressing     Problem: Musculoskeletal - Adult  Goal: Return mobility to safest level of function  Outcome: Progressing     Problem: Metabolic/Fluid and Electrolytes - Adult  Goal: Electrolytes maintained within normal limits  Outcome: Progressing  Goal: Hemodynamic stability and optimal renal function maintained  Outcome: Progressing     Problem: Hematologic - Adult  Goal: Maintains hematologic stability  Outcome: Progressing

## 2024-12-13 NOTE — CARE COORDINATION
12/13/24 1626   Service Assessment   Patient Orientation Alert and Oriented;Person;Place;Situation;Self   Cognition Alert   History Provided By Patient   Primary Caregiver Self   Accompanied By/Relationship N/A   Support Systems Spouse/Significant Other;Children   Patient's Healthcare Decision Maker is: Patient Declined (Legal Next of Kin Remains as Decision Maker)   PCP Verified by CM Yes  (Morena Mcdermott,)   Last Visit to PCP Within last 3 months   Prior Functional Level Independent in ADLs/IADLs   Current Functional Level Independent in ADLs/IADLs   Can patient return to prior living arrangement Yes   Ability to make needs known: Good   Family able to assist with home care needs: Yes   Would you like for me to discuss the discharge plan with any other family members/significant others, and if so, who? Yes  (spouse/sig Denis Bolaños if [phone])   Financial Resources Medicare;Medicaid   Discharge Planning   Type of Residence Apartment   Living Arrangements Spouse/Significant Other;Children   Current Services Prior To Admission None   Potential Assistance Needed N/A   DME Ordered? No   Potential Assistance Purchasing Medications No   Type of Home Care Services None   Patient expects to be discharged to: Apartment   Follow Up Appointment: Best Day/Time    (anytime)   History of falls? 0   Services At/After Discharge   Transition of Care Consult (CM Consult) Discharge Planning    Resource Information Provided? No   Mode of Transport at Discharge Other (see comment)  (family)   Confirm Follow Up Transport Family   Condition of Participation: Discharge Planning   The Plan for Transition of Care is related to the following treatment goals: PCP/Hematologist Dr. Mcdermott   [place] of Choice list was provided with basic dialogue that supports the patient's individualized plan of care/goals, treatment preferences, and shares the quality data associated with the providers?  Yes     JOSÉ MANUEL

## 2024-12-13 NOTE — ED TRIAGE NOTES
C/o all over sickle cell pain but worse in her chest x 3 days. Pt reports she takes 10 mg methadone and 30 mg oxycodone for pain.

## 2024-12-13 NOTE — PROGRESS NOTES
Patient’s case reviewed during interdisciplinary team meeting in Med Surg/Tele Unit 2.  Rev. Andi Roldan MDiv, [facility]

## 2024-12-13 NOTE — DISCHARGE INSTRUCTIONS
It was a pleasure taking care of you in our Emergency Department today.  We know that when you come to [facility], you are entrusting us with your health, comfort, and safety.  Our physicians and nurses honor that trust, and truly appreciate the opportunity to care for you and your loved ones.    We also value your feedback.  If you receive a survey about your Emergency Department experience today, please fill it out.  We care about our patients' feedback, and we listen to what you have to say.  Thank you!      Dr. Roberta Shahid MD

## 2024-12-13 NOTE — PROGRESS NOTES
0450:  pt arrived to unit/room via w/c.  Pt oriented to room and unit P&P, white board updated.  Current orders and POC reviewed with pt, pt requesting I page provider to review pain mgmt plan and adjust orders.    0458:  Standing weight, Admission VS and assessment completed.  Dual skin assessment completed with Emanuel.    0505:  Provider paged via perfect serve per pt request as noted above.    0518:  entered new orders for por roxicodone 5 or 10mg po q4h and pt adjusted frequency of dilaudid order to q3h but dose remains 1mg.      0520:  Provider called unit and spoke with this RN regarding pt pain meds and new orders.  Provider not willing to increase dose above 1mg.  Provider instructed this RN to admin both po and IV pain meds now.      0530:  PO roxicodone 10mg admin olong with scheduled zyprexa    0535:  serum labs collected    0540:  prn IV dilaudid admin for pain 9/10    0542:  IVF hung and infusing to left chest port    0620:  pain reassessed and down to 7/10.  Pt still in significant pain, guarded positioning and tearful.  Pt requesting this RN to page day provider as soon as change of shift complete.    0725:  Message sent to day provider via perfect serve requesting provider review pt pain mgmt plan put in place by [facility] sickle cell mgmt docs (Dr. Cutler and Dr. Landin) as pt is still in significant pain.    0728: provider responded informing this RN he will review and enter orders    0735:  Shift change report given to LINH Damon.  Report included review of SBAR, VS, results review, orders, meds, ROS, and POC.  All questions answered.  Transfer of care complete.    0752:  new orders received, primary nurse informed, this RN to admin per day RN request as orders discussed with provider via phone by this RN and day RN busy with another pt    0758:  Dilaudid 2mg IV one time dose admin to treat pain 8/10.  See flowsheets for details.

## 2024-12-13 NOTE — ED NOTES
TRANSFER - OUT REPORT:    Verbal report given to Hamlet CHAVEZ RN on Tika Landers  being transferred to [facility] for routine progression of patient care       Report consisted of patient's Situation, Background, Assessment and   Recommendations(SBAR).     Information from the following report(s) ED SBAR, MAR, and Recent Results was reviewed with the receiving nurse.    [place] Fall Assessment:                           Lines:   Single Lumen Implantable Port 12/12/24 Left Subclavian (Active)   Port-a-Cath Status Accessed 12/12/24 2307   Site Assessment Clean, dry & intact 12/12/24 2307   Access Needle Gauge 20 G 12/12/24 2307   Access Needle Length 1.5 inches 12/12/24 2307   Accessed By: jigna villafana RN 12/12/24 2307   Single Lumen Status Flushed;Normal saline locked 12/12/24 2307        Opportunity for questions and clarification was provided.      Patient transported with:  Monitor with Camio[employer] tele RN from 2nd floor

## 2024-12-13 NOTE — ED PROVIDER NOTES
[facility] EMERGENCY DEPT  EMERGENCY DEPARTMENT ENCOUNTER         Pt Name: Tika Landers  MRN: [id number]  Birthdate 1995  Date of evaluation: 12/12/2024  Provider: Roberta Shahid MD   PCP: Tu Little III, MD  Note Started: 2:43 AM 12/12/24     CHIEF COMPLAINT       Chief Complaint   Patient presents with    Sickle Cell Pain Crisis        HISTORY OF PRESENT ILLNESS: 1 or more elements      History From: Patient  HPI Limitations: None     Tika Landers is a 29 y.o. 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  Symptoms concerning for vaso-occlusive pain crisis.  She has had multiple hospitalizations last of which was 10/30 - 10/31/2024 for similar intractable pain crisis.  Denies cough, fever, chills.  No palpitations or pleuritic chest pain.  No abdominal pain, nausea, vomiting.  She says that the symptoms she is currently experiencing is similar to what she most recently has experienced in setting of other pain crises.  Pt denies any other exacerbating or ameliorating factors. There are no other complaints, changes or physical findings pertinent to the HPI at this time.        PAST HISTORY     Past Medical History:  Past Medical History:   Diagnosis Date    Asthma     Infusion reaction     Sickle cell anemia (HCC)        Past Surgical History:  No past surgical history on file.    Family History:  No family history on file.    Social History:  Social History     Tobacco Use    Smoking status: Never    Smokeless tobacco: Never   Substance Use Topics    Alcohol use: Yes       Allergies:  Allergies   Allergen Reactions    Nsaids Anaphylaxis    Bactrim [Sulfamethoxazole-Trimethoprim]     Ciprofloxacin

## 2024-12-13 NOTE — ED NOTES
See triage note.  Pt is alert and oriented x 4, RR even and unlabored, skin is warm and dry. Assesment completed and pt updated on plan of care.       Emergency Department Nursing Plan of Care       The Nursing Plan of Care is developed from the Nursing assessment and Emergency Department Attending provider initial evaluation.  The plan of care may be reviewed in the “ED Provider note”.    The Plan of Care was developed with the following considerations:   Patient / Family readiness to learn indicated by:verbalized understanding  Persons(s) to be included in education: patient  Barriers to Learning/Limitations:No    Signed     Effie Baum RN    12/12/2024   11:58 PM

## 2024-12-13 NOTE — CONSULTS
[facility]  at [facility]  [address], [address] [facility] [address], [address]  [place] [address] [phone]        Brief Consult Note      29 yr with Hb SS disease. Cared for by [facility] sickle cell clinic. Admitted with pain crises. Agree with pain control measures. From recent hospitalization at [facility], the Dilaudid PCA protocol has been nicely spelled out. I would recommend following the guidance. Hgb is at baseline. Maintain Hgb  6-7 gm/dl. Call with questions.       Signed by: Hank Patel MD                     December 13, 2024

## 2024-12-13 NOTE — H&P
History and Physical    Date of Service:  12/13/2024  Primary Care Provider: Tu Little III, MD  Source of information: The patient and review of EMR    Chief Complaint: Sickle Cell Pain Crisis      History of Presenting Illness:   Tika Landers is a 29 y.o. female with past medical history significant for sickle cell anemia, asthma, DVT of the right lower extremity on Eliquis for anticoagulation presented at the emergency room with pain.  Patient symptoms started about 2 to 3 days ago, she describes the pain as generalized body aches and pain including chest pain typical of her sickle cell pain crisis.  Patient denies agitated fever, rigors and chills.  She has had multiple admission to the hospital for management of sickle cell pain crisis.  She was last admitted to the hospital in October 2024 for treatment related to her sickle cell.  The pain is constant, sharp, no radiation, no known aggravating or relieving factors and 8/10 in severity  Chest x-ray was negative in the emergency room.  Patient received multiple doses of Dilaudid without significant improvement and because of that she was referred to the hospitalist service for admission.       REVIEW OF SYSTEMS:  REVIEW OF SYSTEMS:  HEAD, EYES, EARS, NOSE AND THROAT:  No headache.  No dizziness.  No blurring of vision.  No photophobia.  RESPIRATORY SYSTEM:  No shortness of breath.  No cough.  No hemoptysis.  CARDIOVASCULAR SYSTEM: Positive for chest pain.  No orthopnea.  No palpitations.  GASTROINTESTINAL SYSTEM:  No nausea or vomiting.  No diarrhea.  No constipation.  GENITOURINARY SYSTEM:  No dysuria, no urgency, and no frequency.     All other systems are reviewed and they are negative.        Past Medical History:   Diagnosis Date    Asthma     Infusion reaction     Sickle cell anemia (HCC)       No past surgical history on file.  Prior to Admission medications    Medication Sig Start Date End Date Taking? Authorizing Provider    ELIQUIS 5 MG TABS tablet  9/23/24  Yes Gerardo Infante MD   folic acid (FOLVITE) 1 MG tablet Take 1 tablet by mouth daily   Yes Gerardo Infante MD   hydroxyurea (HYDREA) 500 MG chemo capsule Take 1 capsule by mouth daily   Yes Gerardo Infante MD   OLANZapine (ZYPREXA) 5 MG tablet Take 1 tablet by mouth every 6 hours   Yes Gerardo Infante MD   oxyCODONE (ROXICODONE) 5 MG immediate release tablet  12/8/24  Yes Gerardo Infante MD   methadone (DOLOPHINE) 10 MG tablet Take 1 tablet by mouth every 4 hours as needed for Pain. Max Daily Amount: 60 mg   Yes Gerardo Infante MD   BELBUCA 450 MCG FILM  10/15/24   Gerardo Infante MD   deferasirox (EXJADE) 125 MG disintegrating tablet Take by mouth every morning (before breakfast)    Gerardo Infante MD   ENOXAPARIN SODIUM IJ Inject 90 mg into the skin every 12 hours    Gerardo Infante MD   hydrOXYzine pamoate (VISTARIL) 25 MG capsule Take 1 capsule by mouth 3 times daily as needed  Patient not taking: Reported on 12/13/2024    Gerardo Infante MD   lamoTRIgine (LAMICTAL) 100 MG tablet Take 1 tablet by mouth 2 times daily  Patient not taking: Reported on 12/13/2024    Gerardo Infante MD   melatonin 3 MG TABS tablet Take 5 mg by mouth    Gerardo Infante MD   norethindrone-ethinyl estradiol (JUNEL FE 1/20) 1-20 MG-MCG per tablet Take 1 tablet by mouth daily    Gerardo Infante MD   ondansetron (ZOFRAN-ODT) 4 MG disintegrating tablet Take 1 tablet by mouth every 8 hours as needed 12/22/23   Gerardo Infante MD   OXBRYTA 500 MG TABS  9/23/24   Gerardo Infante MD     Allergies   Allergen Reactions    Nsaids Anaphylaxis    Bactrim [Sulfamethoxazole-Trimethoprim]     Ciprofloxacin     Promethazine     Xarelto [Rivaroxaban]       No family history on file.   Social History:  reports that she has never smoked. She has never used smokeless tobacco. She reports current alcohol use.   Social Determinants

## 2024-12-13 NOTE — PROGRESS NOTES
[facility]  Hospitalist Group                                                                                          Hospitalist Progress Note  Sebastián Panda MD  Office Phone: [phone]        Date of Service:  2024  NAME:  Tika Landers  :  1995  MRN:  [id number]       Admission Summary:   Tika Landers is a 29 y.o. female with past medical history significant for sickle cell anemia, asthma, DVT of the right lower extremity on Eliquis for anticoagulation presented at the emergency room with pain.  Patient symptoms started about 2 to 3 days ago, she describes the pain as generalized body aches and pain including chest pain typical of her sickle cell pain crisis.  Patient denies agitated fever, rigors and chills.  She has had multiple admission to the hospital for management of sickle cell pain crisis.  She was last admitted to the hospital in 2024 for treatment related to her sickle cell.  The pain is constant, sharp, no radiation, no known aggravating or relieving factors and 8/10 in severity  Chest x-ray was negative in the emergency room.  Patient received multiple doses of Dilaudid without significant improvement and because of that she was referred to the hospitalist service for admission.       Interval history / Subjective:   Patient was displaying immense amount of pain and had difficulty giving her entire history  Explained that she compliant with her medications including hydroxyurea, folic acid and Eliquis.  No longer taking Belbuca.  Last seen hematologist Dr. Mcdermott in 2024.  Believes her current vaso-occlusive episode may be due to change in weather.  Currently is not experiencing chest pain that is tender and generalized intense body aches.  Will reassess later in the day to have a more thorough discussion about patient's pain and past medical history.    Currently, patient denies headache, vision or hearing

## 2024-12-14 VITALS
DIASTOLIC BLOOD PRESSURE: 67 MMHG | BODY MASS INDEX: 30.95 KG/M2 | RESPIRATION RATE: 20 BRPM | HEIGHT: 64 IN | OXYGEN SATURATION: 95 % | SYSTOLIC BLOOD PRESSURE: 103 MMHG | WEIGHT: 181.3 LBS | TEMPERATURE: 99.4 F | HEART RATE: 80 BPM

## 2024-12-14 LAB
ALBUMIN SERPL-MCNC: 3.3 G/DL (ref 3.5–5)
ALBUMIN/GLOB SERPL: 1.2 (ref 1.1–2.2)
ALP SERPL-CCNC: 112 U/L (ref 45–117)
ALT SERPL-CCNC: 18 U/L (ref 12–78)
ANION GAP SERPL CALC-SCNC: 13 MMOL/L (ref 2–12)
AST SERPL-CCNC: 13 U/L (ref 15–37)
BASOPHILS # BLD: 0.1 K/UL (ref 0–0.1)
BASOPHILS NFR BLD: 1 % (ref 0–1)
BILIRUB SERPL-MCNC: 3 MG/DL (ref 0.2–1)
BUN SERPL-MCNC: 6 MG/DL (ref 6–20)
BUN/CREAT SERPL: 8 (ref 12–20)
CALCIUM SERPL-MCNC: 8.6 MG/DL (ref 8.5–10.1)
CHLORIDE SERPL-SCNC: 109 MMOL/L (ref 97–108)
CO2 SERPL-SCNC: 23 MMOL/L (ref 21–32)
CREAT SERPL-MCNC: 0.74 MG/DL (ref 0.55–1.02)
DIFFERENTIAL METHOD BLD: ABNORMAL
EOSINOPHIL # BLD: 1 K/UL (ref 0–0.4)
EOSINOPHIL NFR BLD: 8 % (ref 0–7)
ERYTHROCYTE [DISTWIDTH] IN BLOOD BY AUTOMATED COUNT: 16.5 % (ref 11.5–14.5)
GLOBULIN SER CALC-MCNC: 2.8 G/DL (ref 2–4)
GLUCOSE SERPL-MCNC: 110 MG/DL (ref 65–100)
HCT VFR BLD AUTO: 18.1 % (ref 35–47)
HGB BLD-MCNC: 6.4 G/DL (ref 11.5–16)
IMM GRANULOCYTES # BLD AUTO: 0.1 K/UL (ref 0–0.04)
IMM GRANULOCYTES NFR BLD AUTO: 1 % (ref 0–0.5)
LYMPHOCYTES # BLD: 2.9 K/UL (ref 0.8–3.5)
LYMPHOCYTES NFR BLD: 23 % (ref 12–49)
MAGNESIUM SERPL-MCNC: 2 MG/DL (ref 1.6–2.4)
MCH RBC QN AUTO: 37 PG (ref 26–34)
MCHC RBC AUTO-ENTMCNC: 35.4 G/DL (ref 30–36.5)
MCV RBC AUTO: 104.6 FL (ref 80–99)
MONOCYTES # BLD: 1.4 K/UL (ref 0–1)
MONOCYTES NFR BLD: 11 % (ref 5–13)
NEUTS SEG # BLD: 7.3 K/UL (ref 1.8–8)
NEUTS SEG NFR BLD: 57 % (ref 32–75)
NRBC # BLD: 0.16 K/UL (ref 0–0.01)
NRBC BLD-RTO: 1.3 PER 100 WBC
PHOSPHATE SERPL-MCNC: 3.5 MG/DL (ref 2.6–4.7)
PLATELET # BLD AUTO: 411 K/UL (ref 150–400)
PMV BLD AUTO: 9.7 FL (ref 8.9–12.9)
POTASSIUM SERPL-SCNC: 3.7 MMOL/L (ref 3.5–5.1)
PROT SERPL-MCNC: 6.1 G/DL (ref 6.4–8.2)
RBC # BLD AUTO: 1.73 M/UL (ref 3.8–5.2)
SODIUM SERPL-SCNC: 145 MMOL/L (ref 136–145)
WBC # BLD AUTO: 12.8 K/UL (ref 3.6–11)

## 2024-12-14 PROCEDURE — 84100 ASSAY OF PHOSPHORUS: CPT

## 2024-12-14 PROCEDURE — 2580000003 HC RX 258: Performed by: INTERNAL MEDICINE

## 2024-12-14 PROCEDURE — 6370000000 HC RX 637 (ALT 250 FOR IP): Performed by: INTERNAL MEDICINE

## 2024-12-14 PROCEDURE — 36415 COLL VENOUS BLD VENIPUNCTURE: CPT

## 2024-12-14 PROCEDURE — 85025 COMPLETE CBC W/AUTO DIFF WBC: CPT

## 2024-12-14 PROCEDURE — 80053 COMPREHEN METABOLIC PANEL: CPT

## 2024-12-14 PROCEDURE — 6360000002 HC RX W HCPCS: Performed by: INTERNAL MEDICINE

## 2024-12-14 PROCEDURE — 83735 ASSAY OF MAGNESIUM: CPT

## 2024-12-14 RX ORDER — APIXABAN 5 MG/1
5 TABLET, FILM COATED ORAL 2 TIMES DAILY
Qty: 60 TABLET | Refills: 0 | Status: SHIPPED | OUTPATIENT
Start: 2024-12-14

## 2024-12-14 RX ORDER — HYDROXYUREA 500 MG/1
1500 CAPSULE ORAL DAILY
Qty: 90 CAPSULE | Refills: 0 | Status: SHIPPED | OUTPATIENT
Start: 2024-12-15 | End: 2025-01-14

## 2024-12-14 RX ORDER — FLUTICASONE PROPIONATE AND SALMETEROL 250; 50 UG/1; UG/1
1 POWDER RESPIRATORY (INHALATION) EVERY 12 HOURS
Qty: 60 EACH | Refills: 0 | Status: SHIPPED | OUTPATIENT
Start: 2024-12-14

## 2024-12-14 RX ORDER — BISACODYL 10 MG
10 SUPPOSITORY, RECTAL RECTAL DAILY PRN
Qty: 5 SUPPOSITORY | Refills: 0 | Status: SHIPPED | OUTPATIENT
Start: 2024-12-14 | End: 2024-12-19

## 2024-12-14 RX ORDER — FOLIC ACID 1 MG/1
1 TABLET ORAL DAILY
Qty: 30 TABLET | Refills: 0 | Status: SHIPPED | OUTPATIENT
Start: 2024-12-14

## 2024-12-14 RX ADMIN — Medication 5 MG: at 00:46

## 2024-12-14 RX ADMIN — OXYCODONE 40 MG: 5 TABLET ORAL at 00:46

## 2024-12-14 RX ADMIN — APIXABAN 5 MG: 5 TABLET, FILM COATED ORAL at 09:16

## 2024-12-14 RX ADMIN — HYDROMORPHONE HYDROCHLORIDE: 10 INJECTION, SOLUTION INTRAMUSCULAR; INTRAVENOUS; SUBCUTANEOUS at 03:55

## 2024-12-14 RX ADMIN — FOLIC ACID 1 MG: 1 TABLET ORAL at 09:16

## 2024-12-14 RX ADMIN — HYDROXYUREA 1500 MG: 500 CAPSULE ORAL at 09:17

## 2024-12-14 RX ADMIN — OXYCODONE 40 MG: 5 TABLET ORAL at 05:06

## 2024-12-14 RX ADMIN — OXYCODONE 40 MG: 5 TABLET ORAL at 09:16

## 2024-12-14 RX ADMIN — OXYCODONE 40 MG: 5 TABLET ORAL at 13:54

## 2024-12-14 RX ADMIN — SODIUM CHLORIDE: 4.5 INJECTION, SOLUTION INTRAVENOUS at 00:31

## 2024-12-14 ASSESSMENT — PAIN DESCRIPTION - PAIN TYPE
TYPE: ACUTE PAIN

## 2024-12-14 ASSESSMENT — PAIN SCALES - GENERAL
PAINLEVEL_OUTOF10: 9
PAINLEVEL_OUTOF10: 6
PAINLEVEL_OUTOF10: 7
PAINLEVEL_OUTOF10: 8
PAINLEVEL_OUTOF10: 7
PAINLEVEL_OUTOF10: 8
PAINLEVEL_OUTOF10: 8
PAINLEVEL_OUTOF10: 7
PAINLEVEL_OUTOF10: 7
PAINLEVEL_OUTOF10: 3
PAINLEVEL_OUTOF10: 8
PAINLEVEL_OUTOF10: 9

## 2024-12-14 ASSESSMENT — PAIN DESCRIPTION - DESCRIPTORS
DESCRIPTORS: ACHING;SHARP
DESCRIPTORS: SHARP
DESCRIPTORS: ACHING
DESCRIPTORS: ACHING

## 2024-12-14 ASSESSMENT — PAIN - FUNCTIONAL ASSESSMENT
PAIN_FUNCTIONAL_ASSESSMENT: PREVENTS OR INTERFERES SOME ACTIVE ACTIVITIES AND ADLS

## 2024-12-14 ASSESSMENT — PAIN DESCRIPTION - FREQUENCY
FREQUENCY: CONTINUOUS

## 2024-12-14 ASSESSMENT — PAIN DESCRIPTION - ONSET
ONSET: ON-GOING

## 2024-12-14 ASSESSMENT — PAIN DESCRIPTION - LOCATION
LOCATION: GENERALIZED

## 2024-12-14 ASSESSMENT — PAIN DESCRIPTION - ORIENTATION
ORIENTATION: OTHER (COMMENT)

## 2024-12-14 NOTE — PLAN OF CARE
Problem: Discharge Planning  Goal: Discharge to home or other facility with appropriate resources  Outcome: Progressing     Problem: Pain  Goal: Verbalizes/displays adequate comfort level or baseline comfort level  Outcome: Progressing  Flowsheets (Taken 12/14/2024 0506 by Otto Kang RN)  Verbalizes/displays adequate comfort level or baseline comfort level:   Encourage patient to monitor pain and request assistance   Assess pain using appropriate pain scale     Problem: Cardiovascular - Adult  Goal: Maintains optimal cardiac output and hemodynamic stability  Outcome: Progressing  Goal: Absence of cardiac dysrhythmias or at baseline  Outcome: Progressing     Problem: Musculoskeletal - Adult  Goal: Return mobility to safest level of function  Outcome: Progressing     Problem: Metabolic/Fluid and Electrolytes - Adult  Goal: Electrolytes maintained within normal limits  Outcome: Progressing  Goal: Hemodynamic stability and optimal renal function maintained  Outcome: Progressing     Problem: Hematologic - Adult  Goal: Maintains hematologic stability  Outcome: Progressing

## 2024-12-14 NOTE — PLAN OF CARE
Problem: Discharge Planning  Goal: Discharge to home or other facility with appropriate resources  12/13/2024 2206 by Otto Kang RN  Outcome: Progressing  Flowsheets (Taken 12/13/2024 2051)  Discharge to home or other facility with appropriate resources:   Identify barriers to discharge with patient and caregiver   Arrange for needed discharge resources and transportation as appropriate  12/13/2024 0853 by Hamlet Perez RN  Outcome: Progressing  12/13/2024 0819 by Nelson Zapata RN  Outcome: Progressing     Problem: Pain  Goal: Verbalizes/displays adequate comfort level or baseline comfort level  12/13/2024 2206 by Otto Kang RN  Outcome: Progressing  Flowsheets (Taken 12/13/2024 2051)  Verbalizes/displays adequate comfort level or baseline comfort level:   Encourage patient to monitor pain and request assistance   Assess pain using appropriate pain scale  12/13/2024 0853 by Hamlet Perez RN  Outcome: Progressing  12/13/2024 0819 by Nelson Zapata RN  Outcome: Progressing     Problem: Cardiovascular - Adult  Goal: Maintains optimal cardiac output and hemodynamic stability  12/13/2024 2206 by Otto Kang RN  Outcome: Progressing  Flowsheets (Taken 12/13/2024 2051)  Maintains optimal cardiac output and hemodynamic stability:   Monitor blood pressure and heart rate   Monitor urine output and notify Licensed Independent Practitioner for values outside of normal range  12/13/2024 0853 by Hamlet Perez RN  Outcome: Progressing  Goal: Absence of cardiac dysrhythmias or at baseline  12/13/2024 2206 by Otto Kang RN  Outcome: Progressing  Flowsheets (Taken 12/13/2024 2051)  Absence of cardiac dysrhythmias or at baseline:   Monitor cardiac rate and rhythm   Assess for signs of decreased cardiac output  12/13/2024 0853 by Hamlet Perez RN  Outcome: Progressing     Problem: Musculoskeletal - Adult  Goal: Return mobility to safest level of function  12/13/2024 2206 by Otto Kang RN  Outcome:

## 2024-12-14 NOTE — PROGRESS NOTES
1900: Bedside, verbal, and written shift change report was provided to LINH Menjivar (oncoming nurse) by LINH JACOB (offgoing nurse). The report included the Nurse Handoff Report, adult overview, intake/output status, MAR, recent lab results, vital signs review, orders, medications, ROS, and POC. The whiteboard was updated, and all questions were answered. Transfer of care is complete.    1917: HYDROmorphone (DILAUDID) handoff completed.    2048:Medications were administered as scheduled. Refer to the MAR for more details.    2051: The patient complained of pain, rating it 8/10. PRN medication was administered. See MAR for more details.      2051: Vital signs were taken; the patient reported no pain, and a shift assessment was completed.  Blood pressure 115/62, pulse 89, temperature 98.3 °F (36.8 °C), temperature source Oral, resp. rate 18, height 1.626 m (5' 4\"), weight 82.2 kg (181 lb 4.8 oz), SpO2 94%.    2121: pain reassessment completed.     2207: The plan of care has been thoroughly reviewed and updated as needed    0000: Nursing rounds completed. The patient reported continuous  pain and was resting comfortably in bed.    0030: new to saline bag started.     1249: The patient complained of pain, rating it 9/10. PRN medication was administered. See MAR for more details.    0200: Nursing rounds completed. The patient reported no pain and was resting comfortably in bed.    0350: PCA syringe changed , see flow sheet for more details.      0454 : morning labs collected.    0506: The patient complained of pain, rating it 9/10. PRN medication was administered. See MAR for more details.  Blood pressure (!) 103/51, pulse 63, temperature 98.3 °F (36.8 °C), temperature source Oral, resp. rate 18, height 1.626 m (5' 4\"), weight 82.2 kg (181 lb 4.8 oz), SpO2 93%.    0522: received critical lab report for LADARIUS in lab that hematocrit is 18.6 and reported to Dr. LEE.    06.00: Nursing rounds completed. The patient reported

## 2024-12-14 NOTE — DISCHARGE SUMMARY
Discharge Summary   Please note that this dictation was completed with TextMaster, the computer voice recognition software.  Quite often unanticipated grammatical, syntax, homophones, and other interpretive errors are inadvertently transcribed by the computer software.  Please disregard these errors.  Please excuse any errors that have escaped final proofreading.    PATIENT ID: Tika Landers  MRN: [id number]   YOB: 1995    DATE OF ADMISSION: 12/12/2024  9:48 PM    DATE OF DISCHARGE: 12/14/2024  PRIMARY CARE PROVIDER: Morena Mcdermott MD         ATTENDING PHYSICIAN: Sebastián Panda MD  DISCHARGING PROVIDER: Sebastián Panda MD       CONSULTATIONS: IP CONSULT TO HEMATOLOGY    PROCEDURES/SURGERIES: * No surgery found *    ADMITTING HPI from excerpted H&P   Tika Landers is a 29 y.o. female with past medical history significant for sickle cell anemia, asthma, DVT of the right lower extremity on Eliquis for anticoagulation presented at the emergency room with pain.  Patient symptoms started about 2 to 3 days ago, she describes the pain as generalized body aches and pain including chest pain typical of her sickle cell pain crisis.  Patient denies agitated fever, rigors and chills.  She has had multiple admission to the hospital for management of sickle cell pain crisis.  She was last admitted to the hospital in October 2024 for treatment related to her sickle cell.  The pain is constant, sharp, no radiation, no known aggravating or relieving factors and 8/10 in severity  Chest x-ray was negative in the emergency room.  Patient received multiple doses of Dilaudid without significant improvement and because of that she was referred to the hospitalist service for admission.        HOSPITAL COURSE & DISCHARGE DIAGNOSIS/ PLAN:     In the afternoon, the patient reported effective pain management with her PCA pump, rating her pain at 6 out of 10. At 1:40 PM, I was informed by the nursing staff that the

## 2024-12-14 NOTE — PROGRESS NOTES
For future use.     [facility] Admission Pharmacy Medication Reconciliation    Information obtained from:  Patient interview, Rx Query  RxQuery data available1: yes    Comments/recommendations:    1) The patient was interviewed regarding current PTA medication list, use and drug allergies.     2) Medication changes to PTA list:    Added  none  Removed  Belbuca 450 mcg film  Deferasirox 125 tablet  Enoxaparin 90 mg inj  Hydroxyzine pamoate 25 mg   Lamotrigine 100 mg tablet  Melatonin 3 mg  Norethindrone-ethinyl estradiol tablet  Olanzapine 5 mg  Ondansetron ODT 4 mg  OXBRYTA 500 mg  Adjusted  Oxycodone to IR 30 mg q4h prn  Methadone to 10 mg po BID    3) The [place] Prescription Monitoring Program () was accessed to determine fill history of any controlled medications:  Recurring Belbuca 450 mg film - last filled per  10/16/24 #60 (30-day supply)  Recurring Suboxone 8-2 mg - last filled #90 (30-day supply)       1RxQuery pharmacy benefit data reflects medications filled and processed through the patient's insurance, however                this data does NOT capture whether the medication was picked up or is currently being taken by the patient.       Past Medical History/Disease States:  Past Medical History:   Diagnosis Date    Asthma     Infusion reaction     Sickle cell anemia (HCC)          Patient allergies:   Allergies as of 12/12/2024 - Fully Reviewed 12/12/2024   Allergen Reaction Noted    Nsaids Anaphylaxis 10/29/2024    Bactrim [sulfamethoxazole-trimethoprim]  10/29/2024    Ciprofloxacin  10/29/2024    Promethazine  10/29/2024    Xarelto [rivaroxaban]  10/29/2024         Prior to Admission Medications   Prescriptions Last Dose Informant Patient Reported? Taking?   ELIQUIS 5 MG TABS tablet 12/12/2024 Self Yes Yes   Sig: Take 1 tablet by mouth 2 times daily   folic acid (FOLVITE) 1 MG tablet 12/12/2024 Self Yes Yes   Sig: Take 1 tablet by mouth daily   hydroxyurea (HYDREA) 500 MG chemo capsule 12/12/2024 Self Yes Yes   Sig: Take  1 capsule by mouth daily   methadone (DOLOPHINE) 10 MG tablet 12/12/2024 Self Yes Yes   Sig: Take 1 tablet by mouth in the morning and at bedtime.   oxyCODONE (OXY-IR) 30 MG immediate release tablet 12/12/2024 Self Yes Yes   Sig: Take 1 tablet by mouth every 4 hours as needed for Pain.              Thank you,  JUNIOR BARROS [facility]